# Patient Record
Sex: MALE | Race: WHITE | Employment: FULL TIME | URBAN - METROPOLITAN AREA
[De-identification: names, ages, dates, MRNs, and addresses within clinical notes are randomized per-mention and may not be internally consistent; named-entity substitution may affect disease eponyms.]

---

## 2022-03-16 PROBLEM — I82.469: Status: ACTIVE | Noted: 2022-03-16

## 2022-03-25 ENCOUNTER — HOSPITAL ENCOUNTER (OUTPATIENT)
Dept: INFUSION THERAPY | Age: 32
Discharge: HOME | End: 2022-03-25
Payer: COMMERCIAL

## 2022-03-25 ENCOUNTER — INITIAL CONSULT (OUTPATIENT)
Dept: ONCOLOGY | Age: 32
End: 2022-03-25
Payer: COMMERCIAL

## 2022-03-25 VITALS
HEIGHT: 72 IN | HEART RATE: 82 BPM | BODY MASS INDEX: 31.34 KG/M2 | SYSTOLIC BLOOD PRESSURE: 145 MMHG | TEMPERATURE: 98.2 F | WEIGHT: 231.4 LBS | DIASTOLIC BLOOD PRESSURE: 87 MMHG | OXYGEN SATURATION: 97 % | RESPIRATION RATE: 18 BRPM

## 2022-03-25 DIAGNOSIS — I82.462 ACUTE DEEP VEIN THROMBOSIS (DVT) OF CALF MUSCLE VEIN OF LEFT LOWER EXTREMITY (HCC): ICD-10-CM

## 2022-03-25 DIAGNOSIS — I82.462 ACUTE DEEP VEIN THROMBOSIS (DVT) OF CALF MUSCLE VEIN OF LEFT LOWER EXTREMITY (HCC): Primary | ICD-10-CM

## 2022-03-25 LAB
ALBUMIN SERPL-MCNC: 5.3 GM/DL (ref 3.4–5)
ALP BLD-CCNC: 55 IU/L (ref 40–128)
ALT SERPL-CCNC: 56 U/L (ref 10–40)
ANION GAP SERPL CALCULATED.3IONS-SCNC: 18 MMOL/L (ref 4–16)
AST SERPL-CCNC: 23 IU/L (ref 15–37)
BASOPHILS ABSOLUTE: 0 K/CU MM
BASOPHILS RELATIVE PERCENT: 0.3 % (ref 0–1)
BILIRUB SERPL-MCNC: 0.7 MG/DL (ref 0–1)
BUN BLDV-MCNC: 14 MG/DL (ref 6–23)
CALCIUM SERPL-MCNC: 10.4 MG/DL (ref 8.3–10.6)
CHLORIDE BLD-SCNC: 100 MMOL/L (ref 99–110)
CO2: 21 MMOL/L (ref 21–32)
CREAT SERPL-MCNC: 1 MG/DL (ref 0.9–1.3)
D DIMER: <200 NG/ML(DDU)
DIFFERENTIAL TYPE: ABNORMAL
EOSINOPHILS ABSOLUTE: 0.2 K/CU MM
EOSINOPHILS RELATIVE PERCENT: 2.1 % (ref 0–3)
GFR AFRICAN AMERICAN: >60 ML/MIN/1.73M2
GFR NON-AFRICAN AMERICAN: >60 ML/MIN/1.73M2
GLUCOSE BLD-MCNC: 84 MG/DL (ref 70–99)
HCT VFR BLD CALC: 45.8 % (ref 42–52)
HEMOGLOBIN: 16.5 GM/DL (ref 13.5–18)
LYMPHOCYTES ABSOLUTE: 2.4 K/CU MM
LYMPHOCYTES RELATIVE PERCENT: 32.8 % (ref 24–44)
MCH RBC QN AUTO: 30.6 PG (ref 27–31)
MCHC RBC AUTO-ENTMCNC: 36 % (ref 32–36)
MCV RBC AUTO: 85 FL (ref 78–100)
MONOCYTES ABSOLUTE: 0.5 K/CU MM
MONOCYTES RELATIVE PERCENT: 6.2 % (ref 0–4)
PDW BLD-RTO: 12.3 % (ref 11.7–14.9)
PLATELET # BLD: 237 K/CU MM (ref 140–440)
PMV BLD AUTO: 8.9 FL (ref 7.5–11.1)
POTASSIUM SERPL-SCNC: 4.2 MMOL/L (ref 3.5–5.1)
RBC # BLD: 5.39 M/CU MM (ref 4.6–6.2)
SEGMENTED NEUTROPHILS ABSOLUTE COUNT: 4.3 K/CU MM
SEGMENTED NEUTROPHILS RELATIVE PERCENT: 58.6 % (ref 36–66)
SODIUM BLD-SCNC: 139 MMOL/L (ref 135–145)
TOTAL PROTEIN: 7.8 GM/DL (ref 6.4–8.2)
WBC # BLD: 7.3 K/CU MM (ref 4–10.5)

## 2022-03-25 PROCEDURE — 99204 OFFICE O/P NEW MOD 45 MIN: CPT | Performed by: INTERNAL MEDICINE

## 2022-03-25 PROCEDURE — 80053 COMPREHEN METABOLIC PANEL: CPT

## 2022-03-25 PROCEDURE — 36415 COLL VENOUS BLD VENIPUNCTURE: CPT

## 2022-03-25 PROCEDURE — 85379 FIBRIN DEGRADATION QUANT: CPT

## 2022-03-25 PROCEDURE — 85025 COMPLETE CBC W/AUTO DIFF WBC: CPT

## 2022-03-25 ASSESSMENT — PATIENT HEALTH QUESTIONNAIRE - PHQ9
SUM OF ALL RESPONSES TO PHQ QUESTIONS 1-9: 0
SUM OF ALL RESPONSES TO PHQ QUESTIONS 1-9: 0
SUM OF ALL RESPONSES TO PHQ9 QUESTIONS 1 & 2: 0
SUM OF ALL RESPONSES TO PHQ QUESTIONS 1-9: 0
2. FEELING DOWN, DEPRESSED OR HOPELESS: 0
1. LITTLE INTEREST OR PLEASURE IN DOING THINGS: 0
SUM OF ALL RESPONSES TO PHQ QUESTIONS 1-9: 0

## 2022-03-25 NOTE — PROGRESS NOTES
Patient Name:  Adamaris Hope  Patient :  1990  Patient MRN:  [id number]     Primary Oncologist: Judy Hernandez MD  Referring Provider: Sahil Lowe     Date of Service: 3/25/2022      Reason for Consult: To evaluate the patient with left gastrocnemius vein thrombosis. Chief Complaint:    Chief Complaint   Patient presents with    New Patient     Patient Active Problem List:     Deep vein thrombosis (DVT) of calf muscle vein (HCC)    HPI:   Terri Jaimes. Ethel Maguire is a 32year-old very pleasant gentleman with medical history significant for migraine headache, referred to me on 3/25/22 for evaluation of left gastrocnemius vein thrombosis. He stated that he injured his left knee on 21. Shortly after that he started noticing numbness and cold in his left leg. On 22, it became red, swollen and painful. Lower extremity doppler done on 22 showed left gastrocnemius vein thrombosis. He has been on xarelto since then. Left lower extremity Doppler done on 2022 showed focal left gastrocnemius vein thrombus which is more tense and echogenic than prior examination done on 22 some kind muscle injury opacity and orthopedics saw the patient and they recommended to keep both surgery if he is able he is to do the ultrasound in the left gastrocnemius vein    Left lower extremity Doppler done on 10/24/2022 showed deep vein thrombosis in the gastrocnemius vein of the left calf and change in the imaging interval.  No deep vein thrombosis in the left lower extremity above the popliteal fossa. He denies family history of venous thromboembolism. He said he was active even after knee surgery. He continues to have swelling and pain in his left lower extremity. Still has difficulty putting full weight on the left leg. He also complains of dizziness, shortness of breath and tiredness due to xarelto. He doesn't have any other significant symptoms at today visit.      Past Medical History: Significant for  1. Migraine headache    Past Surgery History:    Significant for  1. Right hand surgery  2. Right leg surgery  3. Right shoulder surgery    Social History:   He denies smoking, alcohol drinking or illicit drug abuse. Family History:    Significant for hypertension in his father, diabetes and stroke in his grandmother. Allergies   Allergen Reactions    Codeine        Current Outpatient Medications on File Prior to Visit   Medication Sig Dispense Refill    rivaroxaban (XARELTO) 20 MG TABS tablet Take 20 mg by mouth       No current facility-administered medications on file prior to visit. Review of Systems:  Constitutional:  No weight loss, No fever, No chills, No night sweats. Energy level good. Eyes:  No diplopia, No transient or permanent loss of vision, No scotomata. ENT / Mouth:  No epistaxis, No dysphagia, No hoarseness, No oral ulcers, No gingival bleeding. No sore throat, No postnasal drip, No nasal drip, No mouth pain, No sinus pain, No tinnitus, Normal hearing. Cardiovascular:  No chest pain, No palpitations, No syncope, No upper extremity edema, No lower extremity edema, No calf discomfort. Respiratory:  No cough. No hemoptysis, No pleurisy, No wheezing, No dyspnea. Gastrointestinal:  No abdominal pain, No abdominal cramping, No nausea, No vomiting, No constipation, No diarrhea, No hematochezia, No melena, No jaundice, No dyspepsia, No dysphagia. Urinary:  No dysuria, No hematuria, No urinary incontinence. Musculoskeletal:  Left leg swelling and pain +, No bone pain, No spine tenderness. Skin:  No rash, No nodules, No pruritus, No lesions. Neurologic:  No confusion, No seizures, No syncope, No tremor, No speech change, No headache, No hiccups, No abnormal gait, No sensory changes, No weakness. Psychiatric:  No depression, No anxiety, Concentration normal.  Endocrine:  No polyuria, No polydipsia, No hot flashes, No thyroid symptoms.   Hematologic:  No epistaxis, No gingival bleeding, No petechiae, No ecchymosis. Lymphatic:  No lymphadenopathy, No lymphedema. Allergy / Immunologic:  No eczema, No frequent mucous infections, No frequent respiratory infections, No recurrent urticarial, No frequent skin infections. Vital Signs: BP (!) 145/87 (Site: Left Upper Arm, Position: Sitting, Cuff Size: Large Adult)   Pulse 82   Temp 98.2 °F (36.8 °C) (Temporal)   Resp 18   Ht 6' (1.829 m)   Wt 231 lb 6.4 oz (105 kg)   SpO2 97%   BMI 31.38 kg/m²      Physical Exam:  CONSTITUTIONAL: awake, alert, cooperative, no apparent distress   EYES: pupils equal, round and reactive to light, sclera clear, normal conjunctiva  ENT: Normocephalic, without obvious abnormality, atraumatic  NECK: supple, symmetrical, no jugular venous distension, no carotid bruits   HEMATOLOGIC/LYMPHATIC: no cervical, supraclavicular or axillary lymphadenopathy   LUNGS: VBS, no wheezes, no increased work of breathing, no rhonchi, clear to auscultation, no crackles,    CARDIOVASCULAR: regular rate and rhythm, normal S1 and S2, no murmur noted  ABDOMEN: normal bowel sounds x 4, soft, non-distended, non-tender, no masses palpated, no hepatosplenomegaly   MUSCULOSKELETAL: full range of motion noted, tone is normal  NEUROLOGIC: awake, alert, oriented to name, place and time. Motor skills grossly intact. SKIN: appears intact, normal skin color, normal texture, normal turgor, no jaundice.    EXTREMITIES: no LE edema, no leg swelling, no cyanosis, no clubbing,       Labs:  Hematology:  Lab Results   Component Value Date    WBC 7.3 03/25/2022    RBC 5.39 03/25/2022    HGB 16.5 03/25/2022    HCT 45.8 03/25/2022    MCV 85.0 03/25/2022    MCH 30.6 03/25/2022    MCHC 36.0 03/25/2022    RDW 12.3 03/25/2022     03/25/2022    MPV 8.9 03/25/2022    SEGSPCT 58.6 03/25/2022    EOSRELPCT 2.1 03/25/2022    BASOPCT 0.3 03/25/2022    LYMPHOPCT 32.8 03/25/2022    MONOPCT 6.2 (H) 03/25/2022    SEGSABS 4.3 03/25/2022 EOSABS 0.2 03/25/2022    BASOSABS 0.0 03/25/2022    LYMPHSABS 2.4 03/25/2022    MONOSABS 0.5 03/25/2022    DIFFTYPE AUTOMATED DIFFERENTIAL 03/25/2022     No results found for: ESR  Chemistry:  Lab Results   Component Value Date     03/25/2022    K 4.2 03/25/2022     03/25/2022    CO2 21 03/25/2022    BUN 14 03/25/2022    CREATININE 1.0 03/25/2022    GLUCOSE 84 03/25/2022    CALCIUM 10.4 03/25/2022    PROT 7.8 03/25/2022    LABALBU 5.3 (H) 03/25/2022    BILITOT 0.7 03/25/2022    ALKPHOS 55 03/25/2022    AST 23 03/25/2022    ALT 56 (H) 03/25/2022    LABGLOM >60 03/25/2022    GFRAA >60 03/25/2022     No results found for: MMA, LDH, HOMOCYSTEINE  No components found for: LD  No results found for: TSHHS, T4FREE, FT3  Immunology:  Lab Results   Component Value Date    PROT 7.8 03/25/2022     No results found for: Abhishek Jones, KLFLCR  No results found for: B2M  Coagulation Panel:  Lab Results   Component Value Date    DDIMER <200 03/25/2022     Anemia Panel:  No results found for: Attila Skeans, FOLATE  Tumor Markers:  No results found for: , CEA, , LABCA2, PSA     Observations:  PHQ-9 Total Score: 0 (3/25/2022  1:29 PM)     Assessment   Left gastrocnemius vein thrombosis with thrombophlebitis    Plan:  Tessa Lary Clement is a 32year-old very pleasant gentleman who developed left gastrocnemius vein thrombosis soon after left knee injury. He was quite symptomatic at that time and started xarelto. His symptoms have improved some, but continues to have significant issue with pain, swelling and weight bearing in left lower extremity. He also has difficulty tolerating xarelto. I discussed with him about management for distal lower extremity DVT. I believe his symptoms are mainly from thrombophlebitis and I will start aspirin 81 mg BID.      Since he continues to have significant symptoms, it is reasonable to continue with anticoagulation therapy (even though [facility] not more than 3 months). Since he has difficulty tolerating xarelto, I will switch it to eliquis. I gave him 4 weeks supply of eliquis sample and ask him to start it tonight. I will repeat doppler in 4 weeks to reassess his left lower extremity DVT. If he is found to have improvement at that time, I will consider to stop eliquis. I will recommend to continue with aspirin for another 3 to 6 months. I answered all his questions and concerns for today. I asked him to follow up with primary care physician on regular basis. I will continue to keep you updated on his progress. Thank you for allowing me to participate in the care of this very pleasant patient. Recent imaging and labs were reviewed and discussed with the patient.

## 2022-03-25 NOTE — LETTER
[address] [place] [address]  Phone: [phone]  Fax: [phone]           Viral Pierre MD      March 25, 2022     Patient: Joslyn Allen   MR Number: [id number]   YOB: 1990   Date of Visit: 3/25/2022       Dear Dr. Judie Beltran:    Thank you for referring Kayy Sherman to me for evaluation/treatment. Below are the relevant portions of my assessment and plan of care. If you have questions, please do not hesitate to call me. I look forward to following Charissa Montes De Oca along with you.     Sincerely,        Viral Pierre MD    CC providers:  Shweta Aguero MD  [address] [place] [address], [address]  [address] [place] [address] [phone]  [phone]

## 2022-03-25 NOTE — PROGRESS NOTES
[place] Rooming Questions  Patient: Vanda Rivero  MRN: [id number]    Date: 3/25/2022        1. Do you have any new issues? np        5. Did the patient have a depression screening completed today?  No    PHQ-9 Total Score: 0 (3/25/2022  1:29 PM)       PHQ-9 Given to (if applicable):               PHQ-9 Score (if applicable):                     [] Positive     []  Negative              Does question #9 need addressed (if applicable)                     [] Yes    []  No               Eden Leos CMA

## 2022-03-30 ENCOUNTER — TELEPHONE (OUTPATIENT)
Dept: ONCOLOGY | Age: 32
End: 2022-03-30

## 2022-03-30 NOTE — TELEPHONE ENCOUNTER
Patient called given US time to be done at [facility] on 4/13 arrival at 12:45 PM.

## 2022-04-22 ENCOUNTER — HOSPITAL ENCOUNTER (OUTPATIENT)
Dept: INFUSION THERAPY | Age: 32
Discharge: HOME | End: 2022-04-22

## 2022-04-22 ENCOUNTER — OFFICE VISIT (OUTPATIENT)
Dept: ONCOLOGY | Age: 32
End: 2022-04-22
Payer: COMMERCIAL

## 2022-04-22 VITALS
RESPIRATION RATE: 18 BRPM | TEMPERATURE: 99 F | HEIGHT: 72 IN | OXYGEN SATURATION: 98 % | BODY MASS INDEX: 31.56 KG/M2 | SYSTOLIC BLOOD PRESSURE: 143 MMHG | WEIGHT: 233 LBS | HEART RATE: 92 BPM | DIASTOLIC BLOOD PRESSURE: 93 MMHG

## 2022-04-22 DIAGNOSIS — I82.462 ACUTE DEEP VEIN THROMBOSIS (DVT) OF CALF MUSCLE VEIN OF LEFT LOWER EXTREMITY (HCC): Primary | ICD-10-CM

## 2022-04-22 PROCEDURE — 99213 OFFICE O/P EST LOW 20 MIN: CPT | Performed by: INTERNAL MEDICINE

## 2022-04-22 NOTE — PROGRESS NOTES
Patient Name:  Lu Swain  Patient :  1990  Patient MRN:  [id number]     Primary Oncologist: Nena Vasquez MD  Referring Provider: Marissa Padilla     Date of Service: 2022      Chief Complaint:    No chief complaint on file. Patient Active Problem List:     Deep vein thrombosis (DVT) of calf muscle vein (HCC)    HPI:   Kaley Rogers. Marybeth Lutz is a 32year-old very pleasant gentleman with medical history significant for migraine headache, initially referred to me on 3/25/22 for evaluation of left gastrocnemius vein thrombosis. He stated that he injured his left knee on 21. Shortly after that he started noticing numbness and cold in his left leg. On 22, it became red, swollen and painful. Lower extremity doppler done on 22 showed left gastrocnemius vein thrombosis. He has been on xarelto since then. Left lower extremity Doppler done on 2022 showed focal left gastrocnemius vein thrombus which is more tense and echogenic than prior examination done on 22 some kind muscle injury opacity and orthopedics saw the patient and they recommended to keep both surgery if he is able he is to do the ultrasound in the left gastrocnemius vein    Left lower extremity Doppler done on 10/24/2022 showed deep vein thrombosis in the gastrocnemius vein of the left calf and change in the imaging interval.  No deep vein thrombosis in the left lower extremity above the popliteal fossa. He denies family history of venous thromboembolism. He said he was active even after knee surgery. He continues to have swelling and pain in his left lower extremity. Still has difficulty putting full weight on the left leg. He also complains of dizziness, shortness of breath and tiredness due to xarelto. I discussed with him about management for distal lower extremity DVT. I believe his symptoms are mainly from thrombophlebitis and I started aspirin 81 mg BID.      Since he continues to have significant symptoms, it is reasonable to continue with anticoagulation therapy (even though [facility] not more than 3 months). Since he has difficulty tolerating xarelto, I switched it to eliquis. Repeat Doppler on 4/13/2022 showed venous thrombus remains within the left gastrocnemius vein within the left calf, similar to the 2/24/2022 examination. No sonographic findings of significant additional left lower extremity venous thrombosis identified. On April 20, 2022, he presented to me for follow-up. I have been following him for left gastrocnemius vein thrombosis and he has been on anticoagulation therapy since 1/6/2022. He is also on aspirin 81 mg twice daily. On today visit, I reviewed with him findings of repeat Doppler done on 4/13/2022. His stay showed left gastrocnemius vein thrombosis and there is no sign of extension of his DVT. Since he is already treated with [facility] therapy for more than 3 months and he only has below knee DVT, I recommend him to stop eliquis today. I don't think continuation with eliquis will give additional benefits to him. He has difficulty tolerating AC therapy. I reviewed with him sign and symptom of progression of DVT. I asked him to contact us if he encounters any of those sign or symptom. Besides off and on episodes of chest pain and SOB, he doesn't have any other significant symptoms at today visit. Past Medical History:     Significant for  1. Migraine headache    Past Surgery History:    Significant for  1. Right hand surgery  2. Right leg surgery  3. Right shoulder surgery    Social History:   He denies smoking, alcohol drinking or illicit drug abuse. Family History:    Significant for hypertension in his father, diabetes and stroke in his grandmother. Allergies:  Codeine     Review of Systems: \"Per interval history; otherwise 10 point ROS is negative. \"  His energy level is good and his sleep is fine.  He denies fever, chills, BASOSABS 0.0 03/25/2022    LYMPHSABS 2.4 03/25/2022    MONOSABS 0.5 03/25/2022    DIFFTYPE AUTOMATED DIFFERENTIAL 03/25/2022     No results found for: ESR  Chemistry:  Lab Results   Component Value Date     03/25/2022    K 4.2 03/25/2022     03/25/2022    CO2 21 03/25/2022    BUN 14 03/25/2022    CREATININE 1.0 03/25/2022    GLUCOSE 84 03/25/2022    CALCIUM 10.4 03/25/2022    PROT 7.8 03/25/2022    LABALBU 5.3 (H) 03/25/2022    BILITOT 0.7 03/25/2022    ALKPHOS 55 03/25/2022    AST 23 03/25/2022    ALT 56 (H) 03/25/2022    LABGLOM >60 03/25/2022    GFRAA >60 03/25/2022     No results found for: MMA, LDH, HOMOCYSTEINE  No components found for: LD  No results found for: TSHHS, T4FREE, FT3  Immunology:  Lab Results   Component Value Date    PROT 7.8 03/25/2022     No results found for: Shannon Denise, KLFLCR  No results found for: B2M  Coagulation Panel:  Lab Results   Component Value Date    DDIMER <200 03/25/2022     Anemia Panel:  No results found for: Rosa Maria Page, FOLATE  Tumor Markers:  No results found for: , CEA, , LABCA2, PSA     Observations:  No data recorded     Assessment   Left gastrocnemius vein thrombosis with thrombophlebitis    Plan:  Fabio Acosta. Peace Vigil is a 32year-old very pleasant gentleman who developed left gastrocnemius vein thrombosis soon after left knee injury. He was quite symptomatic at that time and started xarelto. His symptoms have improved some, but continues to have significant issue with pain, swelling and weight bearing in left lower extremity. He also has difficulty tolerating xarelto. I discussed with him about management for distal lower extremity DVT. I believe his symptoms are mainly from thrombophlebitis and I started aspirin 81 mg BID. Since he continues to have significant symptoms, it is reasonable to continue with anticoagulation therapy (even though we usually treat with [facility] therapy not more than 3 months).  Since he has difficulty tolerating xarelto, I switched it to eliquis. Repeat Doppler on 4/13/2022 showed venous thrombus remains within the left gastrocnemius vein within the left calf, similar to the 2/24/2022 examination. No sonographic findings of significant additional left lower extremity venous thrombosis identified. On April 20, 2022, he presented to me for follow-up. I have been following him for left gastrocnemius vein thrombosis and he has been on anticoagulation therapy since 1/6/2022. He is also on aspirin 81 mg twice daily. On today visit, I reviewed with him findings of repeat Doppler done on 4/13/2022. His stay showed left gastrocnemius vein thrombosis and there is no sign of extension of his DVT. Since he is already treated with [facility] therapy for more than 3 months and he only has below knee DVT, I recommend him to stop eliquis today. I don't think continuation with eliquis will give additional benefits to him. He has difficulty tolerating AC therapy. I reviewed with him sign and symptom of progression of DVT. I asked him to contact us if he encounters any of those sign or symptom. I answered all his questions and concerns for today. I asked him to follow up with primary care physician on regular basis. Recent imaging and labs were reviewed and discussed with the patient.

## 2022-04-22 NOTE — PROGRESS NOTES
[place] Rooming Questions  Patient: Brandon Parekh  MRN: [id number]    Date: 4/22/2022        1. Do you have any new issues? yes - more consistent pain in chest, SOB, pain in left leg, coughing spells, did have a nose bleed- took about an hour to stop. 2. Do you need any refills on medications?    no    3. Have you had any imaging done since your last visit? yes - US    4. Have you been hospitalized or seen in the emergency room since your last visit here?   no    5. Did the patient have a depression screening completed today?  No    No data recorded     PHQ-9 Given to (if applicable):               PHQ-9 Score (if applicable):                     [] Positive     []  Negative              Does question #9 need addressed (if applicable)                     [] Yes    []  No               Simeon Bear CMA